# Patient Record
Sex: MALE | Race: BLACK OR AFRICAN AMERICAN | NOT HISPANIC OR LATINO | Employment: FULL TIME | URBAN - METROPOLITAN AREA
[De-identification: names, ages, dates, MRNs, and addresses within clinical notes are randomized per-mention and may not be internally consistent; named-entity substitution may affect disease eponyms.]

---

## 2018-06-11 ENCOUNTER — OFFICE VISIT (OUTPATIENT)
Dept: FAMILY MEDICINE | Facility: CLINIC | Age: 48
End: 2018-06-11
Payer: COMMERCIAL

## 2018-06-11 VITALS
HEART RATE: 61 BPM | SYSTOLIC BLOOD PRESSURE: 124 MMHG | HEIGHT: 66 IN | RESPIRATION RATE: 20 BRPM | OXYGEN SATURATION: 98 % | BODY MASS INDEX: 30.65 KG/M2 | WEIGHT: 190.69 LBS | DIASTOLIC BLOOD PRESSURE: 82 MMHG | TEMPERATURE: 99 F

## 2018-06-11 DIAGNOSIS — J01.90 ACUTE BACTERIAL SINUSITIS: Primary | ICD-10-CM

## 2018-06-11 DIAGNOSIS — B96.89 ACUTE BACTERIAL SINUSITIS: Primary | ICD-10-CM

## 2018-06-11 PROCEDURE — 99999 PR PBB SHADOW E&M-EST. PATIENT-LVL III: CPT | Mod: PBBFAC,,, | Performed by: FAMILY MEDICINE

## 2018-06-11 PROCEDURE — 99214 OFFICE O/P EST MOD 30 MIN: CPT | Mod: S$GLB,,, | Performed by: FAMILY MEDICINE

## 2018-06-11 PROCEDURE — 3008F BODY MASS INDEX DOCD: CPT | Mod: CPTII,S$GLB,, | Performed by: FAMILY MEDICINE

## 2018-06-11 RX ORDER — LORATADINE 10 MG/1
10 TABLET ORAL DAILY
Qty: 30 TABLET | Refills: 1 | Status: SHIPPED | OUTPATIENT
Start: 2018-06-11 | End: 2020-10-01 | Stop reason: ALTCHOICE

## 2018-06-11 RX ORDER — CODEINE PHOSPHATE AND GUAIFENESIN 10; 100 MG/5ML; MG/5ML
5 SOLUTION ORAL 3 TIMES DAILY PRN
Qty: 180 ML | Refills: 0 | Status: SHIPPED | OUTPATIENT
Start: 2018-06-11 | End: 2018-06-21

## 2018-06-11 RX ORDER — AZITHROMYCIN 250 MG/1
TABLET, FILM COATED ORAL
Qty: 6 TABLET | Refills: 0 | Status: SHIPPED | OUTPATIENT
Start: 2018-06-11 | End: 2020-10-01

## 2018-06-11 RX ORDER — FLUTICASONE PROPIONATE 50 MCG
1 SPRAY, SUSPENSION (ML) NASAL DAILY
Qty: 16 G | Refills: 1 | Status: SHIPPED | OUTPATIENT
Start: 2018-06-11 | End: 2018-07-11

## 2018-06-11 NOTE — PROGRESS NOTES
Chief Complaint   Patient presents with    Cough       HPI  Jossie Collins is a 47 y.o. male with multiple medical diagnoses as listed in the medical history and problem list that presents for cough.    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 Meds: theraflu, OTC.     Pt is known to me and was last seen by me on Visit date not found.    PAST MEDICAL HISTORY:  History reviewed. No pertinent past medical history.    PAST SURGICAL HISTORY:  Past Surgical History:   Procedure Laterality Date    JOINT REPLACEMENT  1988    right shoulder       SOCIAL HISTORY:  Social History     Social History    Marital status: Single     Spouse name: N/A    Number of children: N/A    Years of education: N/A     Occupational History    Not on file.     Social History Main Topics    Smoking status: Never Smoker    Smokeless tobacco: Never Used    Alcohol use Yes      Comment: social    Drug use: No    Sexual activity: Not on file     Other Topics Concern    Not on file     Social History Narrative    No narrative on file       FAMILY HISTORY:  History reviewed. No pertinent family history.    ALLERGIES AND MEDICATIONS: updated and reviewed.  Review of patient's allergies indicates:   Allergen Reactions    Penicillins Hives     Current Outpatient Prescriptions   Medication Sig Dispense Refill    azithromycin (ZITHROMAX Z-GARRY) 250 MG tablet 2 tabs today, then 1 tab per day for 4 days. 6 tablet 0    fluticasone (FLONASE) 50 mcg/actuation nasal spray 1 spray (50 mcg total) by Each Nare route once daily. 16 g 1    guaifenesin-codeine 100-10 mg/5 ml (CHERATUSSIN AC)  mg/5 mL syrup Take 5 mLs by mouth 3 (three) times daily as needed. 180 mL 0    loratadine (CLARITIN) 10 mg tablet Take 1 tablet (10 mg total) by mouth once daily. 30 tablet 1     No current facility-administered medications for this visit.        ROS  Review of Systems  "  Constitutional: Negative for activity change, appetite change and fever.   HENT: Positive for congestion, postnasal drip, rhinorrhea, sinus pressure and sneezing.    Eyes: Positive for itching.   Respiratory: Positive for cough. Negative for shortness of breath and wheezing.    Cardiovascular: Negative for chest pain.   Gastrointestinal: Negative for abdominal pain, diarrhea and nausea.   Endocrine: Negative for polydipsia.   Genitourinary: Negative for dysuria.   Musculoskeletal: Negative for neck stiffness.   Skin: Negative for rash.   Neurological: Negative for numbness.   Psychiatric/Behavioral: Negative for agitation and dysphoric mood.       Physical Exam  Vitals:    06/11/18 0912   BP: 124/82   Pulse: 61   Resp: 20   Temp: 99.3 °F (37.4 °C)    Body mass index is 30.78 kg/m².  Weight: 86.5 kg (190 lb 11.2 oz)   Height: 5' 6" (167.6 cm)     Physical Exam   Constitutional: He is oriented to person, place, and time. He appears well-developed and well-nourished.   HENT:   Head: Normocephalic.   Mouth/Throat: No oropharyngeal exudate.   Erythematous pharynx  Erythematous, edematous nares  Mild dull TMs bilaterally   Eyes: Pupils are equal, round, and reactive to light. No scleral icterus.   Neck: Normal range of motion. Neck supple.   Cardiovascular: Normal rate, regular rhythm and normal heart sounds.    Pulmonary/Chest: Effort normal and breath sounds normal. No respiratory distress.   Abdominal: Soft. Bowel sounds are normal. There is no tenderness.   Lymphadenopathy:     He has cervical adenopathy.   Neurological: He is alert and oriented to person, place, and time.   Skin: Skin is warm. No rash noted.   Psychiatric: He has a normal mood and affect. His behavior is normal. Judgment and thought content normal.       Health Maintenance       Date Due Completion Date    Lipid Panel 1971 ---    TETANUS VACCINE 10/09/1989 ---    Influenza Vaccine 08/01/2018 ---          Assessment & Plan    Acute bacterial " sinusitis  -     guaifenesin-codeine 100-10 mg/5 ml (CHERATUSSIN AC)  mg/5 mL syrup; Take 5 mLs by mouth 3 (three) times daily as needed.  Dispense: 180 mL; Refill: 0  -     azithromycin (ZITHROMAX Z-GARRY) 250 MG tablet; 2 tabs today, then 1 tab per day for 4 days.  Dispense: 6 tablet; Refill: 0  -     loratadine (CLARITIN) 10 mg tablet; Take 1 tablet (10 mg total) by mouth once daily.  Dispense: 30 tablet; Refill: 1  -     fluticasone (FLONASE) 50 mcg/actuation nasal spray; 1 spray (50 mcg total) by Each Nare route once daily.  Dispense: 16 g; Refill: 1  - Treat at this time, monitor symptoms closely  - Return if symptoms persist/worsen        Follow-up in about 4 weeks (around 7/9/2018).

## 2018-06-13 ENCOUNTER — PATIENT MESSAGE (OUTPATIENT)
Dept: FAMILY MEDICINE | Facility: CLINIC | Age: 48
End: 2018-06-13

## 2019-03-18 ENCOUNTER — OFFICE VISIT (OUTPATIENT)
Dept: FAMILY MEDICINE | Facility: CLINIC | Age: 49
End: 2019-03-18
Payer: COMMERCIAL

## 2019-03-18 VITALS
OXYGEN SATURATION: 96 % | RESPIRATION RATE: 17 BRPM | DIASTOLIC BLOOD PRESSURE: 80 MMHG | HEART RATE: 78 BPM | SYSTOLIC BLOOD PRESSURE: 112 MMHG | BODY MASS INDEX: 30.22 KG/M2 | TEMPERATURE: 99 F | WEIGHT: 188.06 LBS | HEIGHT: 66 IN

## 2019-03-18 DIAGNOSIS — Z00.00 WELL ADULT EXAM: ICD-10-CM

## 2019-03-18 DIAGNOSIS — R05.8 POST-VIRAL COUGH SYNDROME: Primary | ICD-10-CM

## 2019-03-18 PROCEDURE — 3008F BODY MASS INDEX DOCD: CPT | Mod: CPTII,S$GLB,, | Performed by: FAMILY MEDICINE

## 2019-03-18 PROCEDURE — 99999 PR PBB SHADOW E&M-EST. PATIENT-LVL III: ICD-10-PCS | Mod: PBBFAC,,, | Performed by: FAMILY MEDICINE

## 2019-03-18 PROCEDURE — 99213 PR OFFICE/OUTPT VISIT, EST, LEVL III, 20-29 MIN: ICD-10-PCS | Mod: S$GLB,,, | Performed by: FAMILY MEDICINE

## 2019-03-18 PROCEDURE — 99999 PR PBB SHADOW E&M-EST. PATIENT-LVL III: CPT | Mod: PBBFAC,,, | Performed by: FAMILY MEDICINE

## 2019-03-18 PROCEDURE — 3008F PR BODY MASS INDEX (BMI) DOCUMENTED: ICD-10-PCS | Mod: CPTII,S$GLB,, | Performed by: FAMILY MEDICINE

## 2019-03-18 PROCEDURE — 99213 OFFICE O/P EST LOW 20 MIN: CPT | Mod: S$GLB,,, | Performed by: FAMILY MEDICINE

## 2019-03-18 RX ORDER — METHYLPREDNISOLONE 4 MG/1
TABLET ORAL
Qty: 1 PACKAGE | Refills: 0 | Status: SHIPPED | OUTPATIENT
Start: 2019-03-18 | End: 2020-10-01

## 2019-03-18 RX ORDER — BENZONATATE 100 MG/1
CAPSULE ORAL
Qty: 30 CAPSULE | Refills: 0 | Status: SHIPPED | OUTPATIENT
Start: 2019-03-18 | End: 2020-10-01

## 2019-03-18 NOTE — PROGRESS NOTES
Chief Complaint   Patient presents with    Cough     stared 2 weeks ago        Jossie Collins is a 48 y.o. male who presents per the Chief Complaint.  Pt is known to me and was last seen by me on 10/10/2014.  All known chronic medical issues have been documented.       Cough   This is a new problem. The current episode started 1 to 4 weeks ago. The problem has been gradually improving. The problem occurs every few minutes. The cough is non-productive. Associated symptoms include postnasal drip and a sore throat. Pertinent negatives include no chest pain, chills, ear congestion, ear pain, eye redness, fever, headaches, heartburn, hemoptysis, myalgias, nasal congestion, rash, rhinorrhea, shortness of breath, sweats, weight loss or wheezing. Nothing aggravates the symptoms. He has tried OTC cough suppressant for the symptoms. His past medical history is significant for environmental allergies. There is no history of asthma, bronchiectasis, bronchitis, COPD, emphysema or pneumonia.        ROS  Review of Systems   Constitutional: Negative.  Negative for activity change, appetite change, chills, diaphoresis, fatigue, fever, unexpected weight change and weight loss.   HENT: Positive for postnasal drip, sore throat and voice change. Negative for congestion, dental problem, drooling, ear discharge, ear pain, facial swelling, hearing loss, mouth sores, nosebleeds, rhinorrhea, sinus pressure, sinus pain, sneezing, tinnitus and trouble swallowing.    Eyes: Negative for pain, discharge, redness and visual disturbance.   Respiratory: Positive for cough. Negative for hemoptysis, choking, chest tightness, shortness of breath and wheezing.    Cardiovascular: Negative for chest pain and leg swelling.   Gastrointestinal: Negative for abdominal pain, constipation, diarrhea, heartburn, nausea and vomiting.   Genitourinary: Negative for difficulty urinating, dysuria, flank pain, frequency and urgency.   Musculoskeletal: Negative.   "Negative for arthralgias, back pain, gait problem, joint swelling, myalgias, neck pain and neck stiffness.   Skin: Negative.  Negative for rash.   Allergic/Immunologic: Positive for environmental allergies. Negative for food allergies and immunocompromised state.   Neurological: Negative.  Negative for dizziness, seizures, syncope, weakness, light-headedness and headaches.   Psychiatric/Behavioral: Positive for sleep disturbance. Negative for confusion, decreased concentration and dysphoric mood. The patient is not nervous/anxious.        Physical Exam  Vitals:    03/18/19 1107   BP: 112/80   Pulse: 78   Resp: 17   Temp: 98.6 °F (37 °C)    Body mass index is 30.35 kg/m².  Weight: 85.3 kg (188 lb 0.8 oz)   Height: 5' 6" (167.6 cm)     Physical Exam   Constitutional: He appears well-developed and well-nourished. He is cooperative.  Non-toxic appearance. He does not have a sickly appearance. He does not appear ill. No distress.   HENT:   Head: Normocephalic and atraumatic.   Right Ear: Hearing, external ear and ear canal normal. No tenderness. No foreign bodies. No mastoid tenderness. Tympanic membrane is bulging. Tympanic membrane is not injected, not scarred, not perforated, not erythematous and not retracted. No decreased hearing is noted.   Left Ear: Hearing, external ear and ear canal normal. No tenderness. No foreign bodies. No mastoid tenderness. Tympanic membrane is bulging. Tympanic membrane is not injected, not scarred, not perforated, not erythematous and not retracted. No decreased hearing is noted.   Nose: Mucosal edema and rhinorrhea present. No nose lacerations, sinus tenderness, nasal deformity, septal deviation or nasal septal hematoma. No epistaxis. Right sinus exhibits no maxillary sinus tenderness and no frontal sinus tenderness. Left sinus exhibits no maxillary sinus tenderness and no frontal sinus tenderness.   Mouth/Throat: Uvula is midline and mucous membranes are normal. He does not have " dentures. No oral lesions. No dental abscesses or dental caries. Posterior oropharyngeal erythema present. No oropharyngeal exudate, posterior oropharyngeal edema or tonsillar abscesses.   Eyes: Conjunctivae and EOM are normal. Pupils are equal, round, and reactive to light. Right eye exhibits no chemosis, no discharge and no exudate. No foreign body present in the right eye. Left eye exhibits no chemosis, no discharge and no exudate. No foreign body present in the left eye. No scleral icterus.   Neck: Normal range of motion and full passive range of motion without pain. No neck rigidity.   Cardiovascular: Normal rate, regular rhythm, S1 normal, S2 normal and normal heart sounds. Exam reveals no gallop and no friction rub.   No murmur heard.  Pulmonary/Chest: Effort normal and breath sounds normal. He has no decreased breath sounds. He has no wheezes. He has no rhonchi. He has no rales.   Lymphadenopathy:        Head (right side): No submental, no submandibular, no tonsillar, no preauricular and no posterior auricular adenopathy present.        Head (left side): No submental, no submandibular, no tonsillar, no preauricular and no posterior auricular adenopathy present.   Neurological: He is alert.       Assessment & Plan    Discussion of plan of care including treatment options regarding health and wellness were reviewed and discussed with patient.  Any changes to medication or treatment plan, as well as any screening blood test, imaging, or referrals to specialist, are documented.  Follow up as indicated.     1. Post-viral cough syndrome  - methylPREDNISolone (MEDROL DOSEPACK) 4 mg tablet; use as directed  Dispense: 1 Package; Refill: 0  - benzonatate (TESSALON) 100 MG capsule; 1-2 capsules every 8 hours as needed for cough.  Dispense: 30 capsule; Refill: 0    2. Well adult exam  Due for annual; blood test ordered to be done before physical exam.  - CBC auto differential; Future  - Comprehensive metabolic panel;  Future  - Lipid panel; Future  - TSH; Future  - T4, free; Future  - Hemoglobin A1c; Future  - PSA, Screening; Future      Follow up documented    ACTIVE MEDICAL ISSUES:  Documented in Problem List    PAST MEDICAL HISTORY  Documented    PAST SURGICAL HISTORY:  Documented    SOCIAL HISTORY:  Documented    FAMILY HISTORY:  Documented    ALLERGIES AND MEDICATIONS: updated and reviewed.  Documented    Health Maintenance       Date Due Completion Date    Lipid Panel 1971 ---    TETANUS VACCINE 10/09/1989 ---    Influenza Vaccine 08/01/2018 ---

## 2020-08-14 DIAGNOSIS — Z11.59 NEED FOR HEPATITIS C SCREENING TEST: ICD-10-CM

## 2020-10-01 ENCOUNTER — OFFICE VISIT (OUTPATIENT)
Dept: URGENT CARE | Facility: CLINIC | Age: 50
End: 2020-10-01
Payer: COMMERCIAL

## 2020-10-01 VITALS
DIASTOLIC BLOOD PRESSURE: 77 MMHG | OXYGEN SATURATION: 96 % | TEMPERATURE: 100 F | BODY MASS INDEX: 31.66 KG/M2 | WEIGHT: 197 LBS | HEIGHT: 66 IN | HEART RATE: 75 BPM | RESPIRATION RATE: 17 BRPM | SYSTOLIC BLOOD PRESSURE: 142 MMHG

## 2020-10-01 DIAGNOSIS — R05.8 PRODUCTIVE COUGH: ICD-10-CM

## 2020-10-01 DIAGNOSIS — J06.9 VIRAL URI WITH COUGH: Primary | ICD-10-CM

## 2020-10-01 LAB
CTP QC/QA: YES
SARS-COV-2 RDRP RESP QL NAA+PROBE: NEGATIVE

## 2020-10-01 PROCEDURE — 99213 OFFICE O/P EST LOW 20 MIN: CPT | Mod: S$GLB,,, | Performed by: NURSE PRACTITIONER

## 2020-10-01 PROCEDURE — U0002: ICD-10-PCS | Mod: QW,S$GLB,, | Performed by: NURSE PRACTITIONER

## 2020-10-01 PROCEDURE — 99213 PR OFFICE/OUTPT VISIT, EST, LEVL III, 20-29 MIN: ICD-10-PCS | Mod: S$GLB,,, | Performed by: NURSE PRACTITIONER

## 2020-10-01 PROCEDURE — U0002 COVID-19 LAB TEST NON-CDC: HCPCS | Mod: QW,S$GLB,, | Performed by: NURSE PRACTITIONER

## 2020-10-01 RX ORDER — LEVOCETIRIZINE DIHYDROCHLORIDE 5 MG/1
5 TABLET, FILM COATED ORAL NIGHTLY
Qty: 90 TABLET | Refills: 0 | Status: SHIPPED | OUTPATIENT
Start: 2020-10-01 | End: 2021-12-06

## 2020-10-01 RX ORDER — FLUTICASONE PROPIONATE 50 MCG
1 SPRAY, SUSPENSION (ML) NASAL DAILY
COMMUNITY
End: 2023-03-10 | Stop reason: SDUPTHER

## 2020-10-01 NOTE — PROGRESS NOTES
"Subjective:       Patient ID: Jossie Collins is a 49 y.o. male.    Vitals:  height is 5' 6" (1.676 m) and weight is 89.4 kg (197 lb). His temperature is 99.5 °F (37.5 °C). His blood pressure is 142/77 (abnormal) and his pulse is 75. His respiration is 17 and oxygen saturation is 96%.     Chief Complaint: Sinus Problem    Patient presents today with complaints of productive cough, nasal congestion and post nasal drip for 4-5 days. He also endorses similar symptoms in the previous week that resolved after 1-2 days with the use of mucinex and delsym. He also uses flonase everyday. He denies fever, chills, ear pain or discharge, sore throat, SOB, wheezing, N/V/D. He denies sick contacts. He has been using mucinex and delsym this time without significant relief.       Constitution: Negative for fatigue and fever.   HENT: Positive for postnasal drip. Negative for ear discharge.    Neck: Negative for painful lymph nodes.   Cardiovascular: Negative for chest pain and leg swelling.   Eyes: Negative for double vision and blurred vision.   Respiratory: Positive for sputum production. Negative for wheezing.    Gastrointestinal: Negative for nausea, vomiting and diarrhea.   Genitourinary: Negative for dysuria, frequency and urgency.   Musculoskeletal: Negative for joint pain, joint swelling, muscle cramps and muscle ache.   Skin: Negative for color change, pale and rash.   Allergic/Immunologic: Negative for seasonal allergies.   Neurological: Negative for dizziness, history of vertigo, light-headedness and passing out.   Hematologic/Lymphatic: Negative for swollen lymph nodes, easy bruising/bleeding and history of blood clots. Does not bruise/bleed easily.   Psychiatric/Behavioral: Negative for nervous/anxious, sleep disturbance and depression. The patient is not nervous/anxious.        Objective:      Physical Exam   Constitutional: He is oriented to person, place, and time. He appears well-developed. He is cooperative.  " Non-toxic appearance. He does not appear ill. No distress.   HENT:   Head: Normocephalic and atraumatic.   Ears:   Right Ear: Hearing, tympanic membrane, external ear and ear canal normal.   Left Ear: Hearing, tympanic membrane, external ear and ear canal normal.   Nose: Nose normal. No mucosal edema, rhinorrhea or nasal deformity. No epistaxis. Right sinus exhibits no maxillary sinus tenderness and no frontal sinus tenderness. Left sinus exhibits no maxillary sinus tenderness and no frontal sinus tenderness.   Mouth/Throat: Uvula is midline, oropharynx is clear and moist and mucous membranes are normal. No trismus in the jaw. Normal dentition. No uvula swelling. No oropharyngeal exudate, posterior oropharyngeal edema or posterior oropharyngeal erythema.   Eyes: Conjunctivae and lids are normal. No scleral icterus.   Neck: Trachea normal, full passive range of motion without pain and phonation normal. Neck supple. No neck rigidity. No edema and no erythema present.   Cardiovascular: Normal rate, regular rhythm, normal heart sounds and normal pulses.   Pulmonary/Chest: Effort normal and breath sounds normal. No respiratory distress. He has no decreased breath sounds. He has no rhonchi.   Abdominal: Normal appearance.   Musculoskeletal: Normal range of motion.         General: No deformity.   Neurological: He is alert and oriented to person, place, and time. He exhibits normal muscle tone. Coordination normal.   Skin: Skin is warm, dry, intact, not diaphoretic and not pale. Psychiatric: His speech is normal and behavior is normal. Judgment and thought content normal.   Nursing note and vitals reviewed.        Assessment:       1. Viral URI with cough    2. Productive cough        Plan:         Viral URI with cough    Productive cough  -     POCT COVID-19 Rapid Screening - NEGATIVE  -     levocetirizine (XYZAL) 5 MG tablet; Take 1 tablet (5 mg total) by mouth every evening.  Dispense: 90 tablet; Refill: 0  -      Continue mucinex and delsym as needed for symptom management  -     Increase rest and fluids  -     Red flag precautions - fever, chills, SOB, wheezing, worsening symptoms RTC

## 2020-10-01 NOTE — PATIENT INSTRUCTIONS
Preventing Common Respiratory Infections  Respiratory infections such as colds and influenza (the flu) are common in winter. These infections are often caused by viruses. They may share some symptoms, but not all respiratory infections are the same. Some make you more sick than others. You can take steps to prevent common respiratory infections. And if you get sick, you can take care of yourself to keep the infection from getting worse.    What is a cold?  · Symptoms include runny nose, coughing and sneezing, and sore throat. Cold symptoms tend to be milder than flu symptoms.  · Symptoms tend to come on slowly. They last for a few days to about a week.  · With a cold, you can still do most of the things you usually do.  What is the flu?  · Symptoms include fever, headache, fatigue, cough, sore throat, runny nose, and muscle aches. Children may have upset stomach and vomiting, but adults usually dont.  · Symptoms tend to come on quickly. Some, such as fatigue and cough, can last a few weeks.  · With the flu, you may feel worn out and not able to do normal activities.  · Its most likely NOT the flu if an adult has vomiting or diarrhea for a day or two. This so-called stomach flu is probably a GI (gastrointestinal) infection.  When the infection gets worse  Without proper care, a respiratory infection can get worse. It can lead to serious complications and death. If you arent getting better, call your healthcare provider. Complications can include:  · Bronchitis (infection of the airways that leads to shortness of breath and coughing up thick yellow or green mucus)  · Pneumonia (infection of the lungs in which fluid and mucus settle in the lungs, making breathing difficult)  · Worsening of chronic conditions such as heart failure, chronic lung disease, asthma, or diabetes  · Severe dehydration (loss of fluids)  · Sinus problems  · Ear infections   Get a flu vaccine  A flu vaccine protects you from influenza  (but not other colds or infections). Get a vaccine each fall, before flu season starts. This can be done at a clinic, healthcare providers office, drugstore, senior center, or through your workplace.  Get pneumococcal vaccines  Pneumonia can be a complication of influenza. There are 2 pneumococcal pneumonia vaccines that protect against many types of pneumonia. Talk with your healthcare provider about these important vaccines.   Keep germs from spreading  No one likes getting sick. To protect yourself and others from cold and flu germs:  · Wash your hands often. Use alcohol-based hand  when you dont have access to soap and water.  · Dont touch your eyes, nose, and mouth. This may help you keep germs out of your body.  · Try to avoid people with respiratory infections. You may want to stay out of crowds during flu season (winter).  · Ask your healthcare provider if you should get a pneumonia vaccination.  How to wash your hands  · Use warm water and plenty of soap. Work up a good lather.  · Clean your whole hand, under your nails, between your fingers, and up your wrists. Wash for at least 15 to 20 seconds. Dont just wipe--rub well.  · Rinse. Let the water run down your fingertips, not up your wrists.  · In a public restroom, use a paper towel to turn off the faucet and open the door.   Date Last Reviewed: 12/1/2016  © 5177-1722 [employer] [address] [place] [address], [place] [address]. All rights reserved. This information is not intended as a substitute for professional medical care. Always follow your healthcare professional's instructions.

## 2020-10-05 ENCOUNTER — PATIENT MESSAGE (OUTPATIENT)
Dept: ADMINISTRATIVE | Facility: HOSPITAL | Age: 50
End: 2020-10-05

## 2021-01-04 ENCOUNTER — PATIENT MESSAGE (OUTPATIENT)
Dept: ADMINISTRATIVE | Facility: HOSPITAL | Age: 51
End: 2021-01-04

## 2021-03-31 ENCOUNTER — PATIENT MESSAGE (OUTPATIENT)
Dept: ADMINISTRATIVE | Facility: CLINIC | Age: 51
End: 2021-03-31

## 2021-04-05 ENCOUNTER — PATIENT MESSAGE (OUTPATIENT)
Dept: ADMINISTRATIVE | Facility: HOSPITAL | Age: 51
End: 2021-04-05

## 2021-05-04 ENCOUNTER — PATIENT MESSAGE (OUTPATIENT)
Dept: RESEARCH | Facility: HOSPITAL | Age: 51
End: 2021-05-04

## 2021-10-04 ENCOUNTER — PATIENT MESSAGE (OUTPATIENT)
Dept: ADMINISTRATIVE | Facility: HOSPITAL | Age: 51
End: 2021-10-04

## 2021-11-20 ENCOUNTER — HOSPITAL ENCOUNTER (EMERGENCY)
Facility: HOSPITAL | Age: 51
Discharge: HOME | End: 2021-11-20
Attending: EMERGENCY MEDICINE
Payer: COMMERCIAL

## 2021-11-20 VITALS
OXYGEN SATURATION: 98 % | HEIGHT: 66 IN | BODY MASS INDEX: 30.86 KG/M2 | TEMPERATURE: 97 F | WEIGHT: 192 LBS | SYSTOLIC BLOOD PRESSURE: 138 MMHG | DIASTOLIC BLOOD PRESSURE: 74 MMHG | RESPIRATION RATE: 18 BRPM | HEART RATE: 87 BPM

## 2021-11-20 DIAGNOSIS — S61.432A PUNCTURE WOUND OF LEFT HAND WITHOUT FOREIGN BODY, INITIAL ENCOUNTER: Primary | ICD-10-CM

## 2021-11-20 PROCEDURE — 25000003 PHARM REV CODE 250: Performed by: PHYSICIAN ASSISTANT

## 2021-11-20 PROCEDURE — 99284 EMERGENCY DEPT VISIT MOD MDM: CPT

## 2021-11-20 PROCEDURE — 90715 TDAP VACCINE 7 YRS/> IM: CPT | Performed by: PHYSICIAN ASSISTANT

## 2021-11-20 PROCEDURE — 63600175 PHARM REV CODE 636 W HCPCS: Performed by: PHYSICIAN ASSISTANT

## 2021-11-20 PROCEDURE — 90471 IMMUNIZATION ADMIN: CPT | Performed by: PHYSICIAN ASSISTANT

## 2021-11-20 RX ORDER — DOXYCYCLINE 100 MG/1
100 CAPSULE ORAL EVERY 12 HOURS
Qty: 20 CAPSULE | Refills: 0 | Status: SHIPPED | OUTPATIENT
Start: 2021-11-20 | End: 2021-11-30

## 2021-11-20 RX ORDER — IBUPROFEN 600 MG/1
600 TABLET ORAL EVERY 6 HOURS PRN
Qty: 20 TABLET | Refills: 0 | Status: SHIPPED | OUTPATIENT
Start: 2021-11-20 | End: 2021-11-25

## 2021-11-20 RX ORDER — ACETAMINOPHEN 500 MG
500 TABLET ORAL EVERY 4 HOURS PRN
Qty: 20 TABLET | Refills: 0 | Status: SHIPPED | OUTPATIENT
Start: 2021-11-20 | End: 2021-11-25

## 2021-11-20 RX ORDER — DOXYCYCLINE HYCLATE 100 MG
100 TABLET ORAL
Status: COMPLETED | OUTPATIENT
Start: 2021-11-20 | End: 2021-11-20

## 2021-11-20 RX ORDER — IBUPROFEN 600 MG/1
600 TABLET ORAL
Status: COMPLETED | OUTPATIENT
Start: 2021-11-20 | End: 2021-11-20

## 2021-11-20 RX ADMIN — IBUPROFEN 600 MG: 600 TABLET, FILM COATED ORAL at 06:11

## 2021-11-20 RX ADMIN — DOXYCYCLINE HYCLATE 100 MG: 100 TABLET, COATED ORAL at 06:11

## 2021-11-20 RX ADMIN — TETANUS TOXOID, REDUCED DIPHTHERIA TOXOID AND ACELLULAR PERTUSSIS VACCINE, ADSORBED 0.5 ML: 5; 2.5; 8; 8; 2.5 SUSPENSION INTRAMUSCULAR at 07:11

## 2021-12-06 ENCOUNTER — OFFICE VISIT (OUTPATIENT)
Dept: FAMILY MEDICINE | Facility: CLINIC | Age: 51
End: 2021-12-06
Payer: COMMERCIAL

## 2021-12-06 VITALS
DIASTOLIC BLOOD PRESSURE: 86 MMHG | HEIGHT: 66 IN | HEART RATE: 80 BPM | WEIGHT: 183.88 LBS | RESPIRATION RATE: 16 BRPM | OXYGEN SATURATION: 97 % | TEMPERATURE: 99 F | BODY MASS INDEX: 29.55 KG/M2 | SYSTOLIC BLOOD PRESSURE: 130 MMHG

## 2021-12-06 DIAGNOSIS — Z12.11 ENCOUNTER FOR COLORECTAL CANCER SCREENING: ICD-10-CM

## 2021-12-06 DIAGNOSIS — Z00.00 WELL ADULT EXAM: Primary | ICD-10-CM

## 2021-12-06 DIAGNOSIS — Z12.12 ENCOUNTER FOR COLORECTAL CANCER SCREENING: ICD-10-CM

## 2021-12-06 PROCEDURE — 99999 PR PBB SHADOW E&M-EST. PATIENT-LVL V: ICD-10-PCS | Mod: PBBFAC,,, | Performed by: FAMILY MEDICINE

## 2021-12-06 PROCEDURE — 99396 PR PREVENTIVE VISIT,EST,40-64: ICD-10-PCS | Mod: S$GLB,,, | Performed by: FAMILY MEDICINE

## 2021-12-06 PROCEDURE — 99396 PREV VISIT EST AGE 40-64: CPT | Mod: S$GLB,,, | Performed by: FAMILY MEDICINE

## 2021-12-06 PROCEDURE — 99999 PR PBB SHADOW E&M-EST. PATIENT-LVL V: CPT | Mod: PBBFAC,,, | Performed by: FAMILY MEDICINE

## 2022-02-04 ENCOUNTER — TELEPHONE (OUTPATIENT)
Dept: ENDOSCOPY | Facility: HOSPITAL | Age: 52
End: 2022-02-04
Payer: COMMERCIAL

## 2022-02-04 DIAGNOSIS — Z12.11 SCREENING FOR COLON CANCER: Primary | ICD-10-CM

## 2022-02-04 DIAGNOSIS — Z01.818 PRE-OP TESTING: Primary | ICD-10-CM

## 2022-02-04 RX ORDER — POLYETHYLENE GLYCOL 3350, SODIUM SULFATE ANHYDROUS, SODIUM BICARBONATE, SODIUM CHLORIDE, POTASSIUM CHLORIDE 236; 22.74; 6.74; 5.86; 2.97 G/4L; G/4L; G/4L; G/4L; G/4L
4 POWDER, FOR SOLUTION ORAL ONCE
Qty: 4000 ML | Refills: 0 | Status: SHIPPED | OUTPATIENT
Start: 2022-02-04 | End: 2022-02-04

## 2022-03-11 ENCOUNTER — LAB VISIT (OUTPATIENT)
Dept: FAMILY MEDICINE | Facility: CLINIC | Age: 52
End: 2022-03-11
Payer: COMMERCIAL

## 2022-03-11 DIAGNOSIS — Z01.818 PRE-OP TESTING: ICD-10-CM

## 2022-03-11 LAB
SARS-COV-2 RNA RESP QL NAA+PROBE: NOT DETECTED
SARS-COV-2- CYCLE NUMBER: NORMAL

## 2022-03-11 PROCEDURE — U0003 INFECTIOUS AGENT DETECTION BY NUCLEIC ACID (DNA OR RNA); SEVERE ACUTE RESPIRATORY SYNDROME CORONAVIRUS 2 (SARS-COV-2) (CORONAVIRUS DISEASE [COVID-19]), AMPLIFIED PROBE TECHNIQUE, MAKING USE OF HIGH THROUGHPUT TECHNOLOGIES AS DESCRIBED BY CMS-2020-01-R: HCPCS | Performed by: FAMILY MEDICINE

## 2022-03-11 PROCEDURE — U0005 INFEC AGEN DETEC AMPLI PROBE: HCPCS | Performed by: FAMILY MEDICINE

## 2022-03-14 ENCOUNTER — HOSPITAL ENCOUNTER (OUTPATIENT)
Facility: HOSPITAL | Age: 52
Discharge: HOME | End: 2022-03-14
Attending: INTERNAL MEDICINE | Admitting: INTERNAL MEDICINE
Payer: COMMERCIAL

## 2022-03-14 ENCOUNTER — ANESTHESIA EVENT (OUTPATIENT)
Dept: ENDOSCOPY | Facility: HOSPITAL | Age: 52
End: 2022-03-14

## 2022-03-14 ENCOUNTER — ANESTHESIA (OUTPATIENT)
Dept: ENDOSCOPY | Facility: HOSPITAL | Age: 52
End: 2022-03-14
Payer: COMMERCIAL

## 2022-03-14 VITALS
RESPIRATION RATE: 18 BRPM | SYSTOLIC BLOOD PRESSURE: 159 MMHG | OXYGEN SATURATION: 98 % | TEMPERATURE: 98 F | HEART RATE: 62 BPM | DIASTOLIC BLOOD PRESSURE: 81 MMHG

## 2022-03-14 DIAGNOSIS — Z12.11 SCREEN FOR COLON CANCER: ICD-10-CM

## 2022-03-14 PROCEDURE — 37000009 HC ANESTHESIA EA ADD 15 MINS: Performed by: INTERNAL MEDICINE

## 2022-03-14 PROCEDURE — 25000003 PHARM REV CODE 250: Performed by: STUDENT IN AN ORGANIZED HEALTH CARE EDUCATION/TRAINING PROGRAM

## 2022-03-14 PROCEDURE — 88305 TISSUE EXAM BY PATHOLOGIST: CPT | Mod: 26,,, | Performed by: PATHOLOGY

## 2022-03-14 PROCEDURE — 88305 TISSUE EXAM BY PATHOLOGIST: ICD-10-PCS | Mod: 26,,, | Performed by: PATHOLOGY

## 2022-03-14 PROCEDURE — D9220A PRA ANESTHESIA: Mod: 33,,, | Performed by: STUDENT IN AN ORGANIZED HEALTH CARE EDUCATION/TRAINING PROGRAM

## 2022-03-14 PROCEDURE — 45380 COLONOSCOPY AND BIOPSY: CPT | Mod: 33,,, | Performed by: INTERNAL MEDICINE

## 2022-03-14 PROCEDURE — D9220A PRA ANESTHESIA: ICD-10-PCS | Mod: 33,,, | Performed by: STUDENT IN AN ORGANIZED HEALTH CARE EDUCATION/TRAINING PROGRAM

## 2022-03-14 PROCEDURE — 27201012 HC FORCEPS, HOT/COLD, DISP: Performed by: INTERNAL MEDICINE

## 2022-03-14 PROCEDURE — 45380 COLONOSCOPY AND BIOPSY: CPT | Mod: PT | Performed by: INTERNAL MEDICINE

## 2022-03-14 PROCEDURE — 63600175 PHARM REV CODE 636 W HCPCS: Performed by: STUDENT IN AN ORGANIZED HEALTH CARE EDUCATION/TRAINING PROGRAM

## 2022-03-14 PROCEDURE — 37000008 HC ANESTHESIA 1ST 15 MINUTES: Performed by: INTERNAL MEDICINE

## 2022-03-14 PROCEDURE — 45380 PR COLONOSCOPY,BIOPSY: ICD-10-PCS | Mod: 33,,, | Performed by: INTERNAL MEDICINE

## 2022-03-14 PROCEDURE — 88305 TISSUE EXAM BY PATHOLOGIST: CPT | Performed by: PATHOLOGY

## 2022-03-14 PROCEDURE — D9220A PRA ANESTHESIA: ICD-10-PCS | Mod: 33,,, | Performed by: ANESTHESIOLOGY

## 2022-03-14 PROCEDURE — D9220A PRA ANESTHESIA: Mod: 33,,, | Performed by: ANESTHESIOLOGY

## 2022-03-14 RX ORDER — LIDOCAINE HYDROCHLORIDE 20 MG/ML
INJECTION INTRAVENOUS
Status: DISCONTINUED | OUTPATIENT
Start: 2022-03-14 | End: 2022-03-14

## 2022-03-14 RX ORDER — PROPOFOL 10 MG/ML
VIAL (ML) INTRAVENOUS
Status: DISCONTINUED | OUTPATIENT
Start: 2022-03-14 | End: 2022-03-14

## 2022-03-14 RX ORDER — PROPOFOL 10 MG/ML
INJECTION, EMULSION INTRAVENOUS
Status: DISCONTINUED
Start: 2022-03-14 | End: 2022-03-14 | Stop reason: HOSPADM

## 2022-03-14 RX ORDER — SODIUM CHLORIDE 9 MG/ML
INJECTION, SOLUTION INTRAVENOUS CONTINUOUS
Status: DISCONTINUED | OUTPATIENT
Start: 2022-03-14 | End: 2022-03-14 | Stop reason: HOSPADM

## 2022-03-14 RX ORDER — LIDOCAINE HYDROCHLORIDE 20 MG/ML
INJECTION, SOLUTION EPIDURAL; INFILTRATION; INTRACAUDAL; PERINEURAL
Status: DISCONTINUED
Start: 2022-03-14 | End: 2022-03-14 | Stop reason: HOSPADM

## 2022-03-14 RX ADMIN — PROPOFOL 80 MG: 10 INJECTION, EMULSION INTRAVENOUS at 09:03

## 2022-03-14 RX ADMIN — PROPOFOL 20 MG: 10 INJECTION, EMULSION INTRAVENOUS at 10:03

## 2022-03-14 RX ADMIN — LIDOCAINE HYDROCHLORIDE 100 MG: 20 INJECTION, SOLUTION INTRAVENOUS at 09:03

## 2022-03-14 RX ADMIN — SODIUM CHLORIDE: 0.9 INJECTION, SOLUTION INTRAVENOUS at 09:03

## 2022-03-14 NOTE — H&P
Short Stay Endoscopy History and Physical    PCP - Azikiwe K Lombard, MD    Procedure - Colonoscopy  ASA - per anesthesia  Mallampati - per anesthesia  History of Anesthesia problems - no  Family history Anesthesia problems - no   Plan of anesthesia - General    HPI:  This is a 51 y.o. male here for evaluation of : asymptomatic screening exam      ROS:  Constitutional: No fevers, chills, No weight loss  CV: No chest pain  Pulm: No cough, No shortness of breath  GI: see HPI  Derm: No rash    Medical History:  has no past medical history on file.    Surgical History:  has a past surgical history that includes Joint replacement (1988).    Family History: family history is not on file.. Otherwise no colon cancer, inflammatory bowel disease, or GI malignancies.    Social History:  reports that he has never smoked. He has never used smokeless tobacco. He reports current alcohol use. He reports that he does not use drugs.    Review of patient's allergies indicates:   Allergen Reactions    Penicillins Hives       Medications:   Medications Prior to Admission   Medication Sig Dispense Refill Last Dose    fluticasone propionate (FLONASE) 50 mcg/actuation nasal spray 1 spray by Each Nostril route once daily.            Physical Exam:    Vital Signs:   Vitals:    03/14/22 0845   BP: 139/64   Pulse: 72   Resp: 18   Temp: 98.5 °F (36.9 °C)       Gen: NAD, lying comfortably  HENT: NCAT, oropharynx clear  Eyes: anicteric sclerae, EOMI grossly  Neck: supple, no visible masses/goiter  Cardiac: RRR  Lungs: non-labored breathing  Abd: soft, NT/ND, normoactive BS  Ext: no LE edema, warm, well perfused  Skin: skin intact on exposed body parts, no visible rashes, lesions  Neuro: A&Ox4, neuro exam grossly intact, moves all extremities  Psych: appropriate mood, affect        Labs:  Lab Results   Component Value Date    WBC 4.36 12/06/2021    HGB 13.8 (L) 12/06/2021    HCT 40.7 12/06/2021     12/06/2021    CHOL 169 12/06/2021     TRIG 152 (H) 12/06/2021    HDL 37 (L) 12/06/2021    ALT 27 12/06/2021    AST 27 12/06/2021     12/06/2021    K 4.3 12/06/2021     12/06/2021    CREATININE 1.0 12/06/2021    BUN 13 12/06/2021    CO2 26 12/06/2021    TSH 0.935 12/06/2021    PSA 0.41 12/06/2021    HGBA1C 4.8 12/06/2021       Plan:  Colonoscopy for colon cancer screening.    I have explained the risks and benefits of endoscopy procedures to the patient including but not limited to bleeding, perforation, infection, and death.  The patient was asked if they understand and allowed to ask any further questions to their satisfaction.    Marylin Pedraza MD

## 2022-03-14 NOTE — ANESTHESIA POSTPROCEDURE EVALUATION
Anesthesia Post Evaluation    Patient: Jossie Collins    Procedure(s) Performed: Procedure(s) (LRB):  COLONOSCOPY (N/A)    Final Anesthesia Type: general      Patient location during evaluation: GI PACU  Patient participation: Yes- Able to Participate  Level of consciousness: awake and alert  Post-procedure vital signs: reviewed and stable  Pain management: adequate  Airway patency: patent    PONV status at discharge: No PONV  Anesthetic complications: no      Cardiovascular status: blood pressure returned to baseline  Respiratory status: unassisted and spontaneous ventilation  Hydration status: euvolemic  Follow-up not needed.          Vitals Value Taken Time   /62 03/14/22 1025   Temp 36.5 °C (97.7 °F) 03/14/22 1025   Pulse 75 03/14/22 1025   Resp 18 03/14/22 1025   SpO2 96 % 03/14/22 1025         No case tracking events are documented in the log.      Pain/Sussy Score: No data recorded

## 2022-03-14 NOTE — PROVATION PATIENT INSTRUCTIONS
Discharge Summary/Instructions after an Endoscopic Procedure  Patient Name: Jossie Collins  Patient MRN: [id number]  Patient YOB: 1970  Monday, March 14, 2022  Marylin Pedraza MD  Dear patient,  As a result of recent federal legislation (The Federal Cures Act), you may   receive lab or pathology results from your procedure in your [facility]   account before your physician is able to contact you. Your physician or   their representative will relay the results to you with their   recommendations at their soonest availability.  Thank you,  RESTRICTIONS:  During your procedure today, you received medications for sedation.  These   medications may affect your judgment, balance and coordination.  Therefore,   for 24 hours, you have the following restrictions:   - DO NOT drive a car, operate machinery, make legal/financial decisions,   sign important papers or drink alcohol.    ACTIVITY:  Today: no heavy lifting, straining or running due to procedural   sedation/anesthesia.  The following day: return to full activity including work.  DIET:  Eat and drink normally unless instructed otherwise.     TREATMENT FOR COMMON SIDE EFFECTS:  - Mild abdominal pain, nausea, belching, bloating or excessive gas:  rest,   eat lightly and use a heating pad.  - Sore Throat: treat with throat lozenges and/or gargle with warm salt   water.  - Because air was used during the procedure, expelling large amounts of air   from your rectum or belching is normal.  - If a bowel prep was taken, you may not have a bowel movement for 1-3 days.    This is normal.  SYMPTOMS TO WATCH FOR AND REPORT TO YOUR PHYSICIAN:  1. Abdominal pain or bloating, other than gas cramps.  2. Chest pain.  3. Back pain.  4. Signs of infection such as: chills or fever occurring within 24 hours   after the procedure.  5. Rectal bleeding, which would show as bright red, maroon, or black stools.   (A tablespoon of blood from the rectum is not serious, especially  if   hemorrhoids are present.)  6. Vomiting.  7. Weakness or dizziness.  GO DIRECTLY TO THE NEAREST EMERGENCY ROOM IF YOU HAVE ANY OF THE FOLLOWING:      Difficulty breathing              Chills and/or fever over 101 F   Persistent vomiting and/or vomiting blood   Severe abdominal pain   Severe chest pain   Black, tarry stools   Bleeding- more than one tablespoon   Any other symptom or condition that you feel may need urgent attention  Your doctor recommends these additional instructions:  If any biopsies were taken, your doctors clinic will contact you in 1 to 2   weeks with any results.  - Discharge patient to home.   - Resume previous diet.   - Continue present medications.   - Await pathology results.   - Repeat colonoscopy in 5 years for surveillance.  For questions, problems or results please call your physician - Marylin Pedraza MD at Work:  [phone].  [facility] can be reached at [phone]     IF A COMPLICATION OR EMERGENCY SITUATION ARISES AND YOU ARE UNABLE TO REACH   YOUR PHYSICIAN - GO DIRECTLY TO THE EMERGENCY ROOM.  Marylin Pedraza MD  3/14/2022 10:24:26 AM  This report has been verified and signed electronically.  Dear patient,  As a result of recent federal legislation (The Federal Cures Act), you may   receive lab or pathology results from your procedure in your [facility]   account before your physician is able to contact you. Your physician or   their representative will relay the results to you with their   recommendations at their soonest availability.  Thank you,  PROVATION

## 2022-03-14 NOTE — OR NURSING
Procedure and recovery completed. Dr. Pedraza discussed findings with patient and discharge instructions given. Understanding verbalized. Patient ready for discharge.

## 2022-03-14 NOTE — ANESTHESIA PREPROCEDURE EVALUATION
03/14/2022  Jossie Collins is a 51 y.o., male.  Pre-operative evaluation for Procedure(s) (LRB):  COLONOSCOPY (N/A)    NPO >8 food; 2h prep    METS >4    There were no vitals filed for this visit.      There is no problem list on file for this patient.      Review of patient's allergies indicates:   Allergen Reactions    Penicillins Hives       No current facility-administered medications on file prior to encounter.     Current Outpatient Medications on File Prior to Encounter   Medication Sig Dispense Refill    fluticasone propionate (FLONASE) 50 mcg/actuation nasal spray 1 spray by Each Nostril route once daily.         Past Surgical History:   Procedure Laterality Date    JOINT REPLACEMENT  1988    right shoulder       Social History     Socioeconomic History    Marital status:    Tobacco Use    Smoking status: Never Smoker    Smokeless tobacco: Never Used   Substance and Sexual Activity    Alcohol use: Yes     Comment: social    Drug use: No         Lab Results   Component Value Date    WBC 4.36 12/06/2021    HGB 13.8 (L) 12/06/2021    HCT 40.7 12/06/2021    MCV 91 12/06/2021     12/06/2021       CMP  Sodium   Date Value Ref Range Status   12/06/2021 141 136 - 145 mmol/L Final     Potassium   Date Value Ref Range Status   12/06/2021 4.3 3.5 - 5.1 mmol/L Final     Chloride   Date Value Ref Range Status   12/06/2021 107 95 - 110 mmol/L Final     CO2   Date Value Ref Range Status   12/06/2021 26 23 - 29 mmol/L Final     Glucose   Date Value Ref Range Status   12/06/2021 114 (H) 70 - 110 mg/dL Final     BUN   Date Value Ref Range Status   12/06/2021 13 6 - 20 mg/dL Final     Creatinine   Date Value Ref Range Status   12/06/2021 1.0 0.5 - 1.4 mg/dL Final     Calcium   Date Value Ref Range Status   12/06/2021 9.6 8.7 - 10.5 mg/dL Final     Total Protein   Date Value Ref Range Status    2021 7.4 6.0 - 8.4 g/dL Final     Albumin   Date Value Ref Range Status   2021 4.0 3.5 - 5.2 g/dL Final     Total Bilirubin   Date Value Ref Range Status   2021 0.7 0.1 - 1.0 mg/dL Final     Comment:     For infants and newborns, interpretation of results should be based  on gestational age, weight and in agreement with clinical  observations.    Premature Infant recommended reference ranges:  Up to 24 hours.............<8.0 mg/dL  Up to 48 hours............<12.0 mg/dL  3-5 days..................<15.0 mg/dL  6-29 days.................<15.0 mg/dL       Alkaline Phosphatase   Date Value Ref Range Status   2021 63 55 - 135 U/L Final     AST   Date Value Ref Range Status   2021 27 10 - 40 U/L Final     ALT   Date Value Ref Range Status   2021 27 10 - 44 U/L Final     Anion Gap   Date Value Ref Range Status   2021 8 8 - 16 mmol/L Final     eGFR if    Date Value Ref Range Status   2021 >60.0 >60 mL/min/1.73 m^2 Final     eGFR if non    Date Value Ref Range Status   2021 >60.0 >60 mL/min/1.73 m^2 Final     Comment:     Calculation used to obtain the estimated glomerular filtration  rate (eGFR) is the CKD-EPI equation.            Diagnostic Studies:      EKD Echo:  No results found for this or any previous visit.        Pre-op Assessment    I have reviewed the Patient Summary Reports.     I have reviewed the Nursing Notes. I have reviewed the NPO Status.   I have reviewed the Medications.     Review of Systems  Anesthesia Hx:  No problems with previous Anesthesia  History of prior surgery of interest to airway management or planning:  Denies Personal Hx of Anesthesia complications.   Social:  Non-Smoker, Social Alcohol Use    Hematology/Oncology:  Hematology Normal   Oncology Normal     EENT/Dental:   chronic allergic rhinitis   Cardiovascular:  Cardiovascular Normal Exercise tolerance: good     Pulmonary:  Pulmonary Normal     Renal/:  Renal/ Normal     Hepatic/GI:   Bowel Prep.    Neurological:  Neurology Normal    Endocrine:  Endocrine Normal        Physical Exam  General: Well nourished    Airway:  Mallampati: II   Mouth Opening: Normal  TM Distance: > 6 cm  Tongue: Normal  Neck ROM: Normal ROM    Dental:  Intact        Anesthesia Plan  Type of Anesthesia, risks & benefits discussed:    Anesthesia Type: Gen Natural Airway, MAC  Intra-op Monitoring Plan: Standard ASA Monitors  Induction:  IV  Informed Consent: Informed consent signed with the Patient and all parties understand the risks and agree with anesthesia plan.  All questions answered.   ASA Score: 2  Day of Surgery Review of History & Physical: H&P Update referred to the surgeon/provider.    Ready For Surgery From Anesthesia Perspective.     .

## 2022-03-14 NOTE — TRANSFER OF CARE
Anesthesia Transfer of Care Note    Patient: Jossie Collins    Procedure(s) Performed: Procedure(s) (LRB):  COLONOSCOPY (N/A)    Patient location: GI    Anesthesia Type: general    Transport from OR: Transported from OR on room air with adequate spontaneous ventilation    Post pain: adequate analgesia    Post assessment: no apparent anesthetic complications and tolerated procedure well    Post vital signs: stable    Level of consciousness: awake and alert    Nausea/Vomiting: no nausea/vomiting    Complications: none    Transfer of care protocol was followed      Last vitals:   Visit Vitals  /62   Pulse 75   Temp 36.5 °C (97.7 °F) (Axillary)   Resp 18   SpO2 96%

## 2022-03-15 LAB
FINAL PATHOLOGIC DIAGNOSIS: NORMAL
GROSS: NORMAL
Lab: NORMAL

## 2022-07-26 ENCOUNTER — PATIENT MESSAGE (OUTPATIENT)
Dept: ADMINISTRATIVE | Facility: HOSPITAL | Age: 52
End: 2022-07-26
Payer: COMMERCIAL

## 2022-07-26 ENCOUNTER — PATIENT OUTREACH (OUTPATIENT)
Dept: ADMINISTRATIVE | Facility: HOSPITAL | Age: 52
End: 2022-07-26
Payer: COMMERCIAL

## 2023-01-11 ENCOUNTER — PATIENT OUTREACH (OUTPATIENT)
Dept: ADMINISTRATIVE | Facility: HOSPITAL | Age: 53
End: 2023-01-11
Payer: COMMERCIAL

## 2023-03-10 ENCOUNTER — OFFICE VISIT (OUTPATIENT)
Dept: FAMILY MEDICINE | Facility: CLINIC | Age: 53
End: 2023-03-10
Payer: COMMERCIAL

## 2023-03-10 VITALS
DIASTOLIC BLOOD PRESSURE: 80 MMHG | OXYGEN SATURATION: 95 % | RESPIRATION RATE: 18 BRPM | HEIGHT: 66 IN | HEART RATE: 73 BPM | SYSTOLIC BLOOD PRESSURE: 130 MMHG | TEMPERATURE: 98 F | WEIGHT: 190.25 LBS | BODY MASS INDEX: 30.58 KG/M2

## 2023-03-10 DIAGNOSIS — Z00.00 ANNUAL PHYSICAL EXAM: Primary | ICD-10-CM

## 2023-03-10 DIAGNOSIS — J30.2 SEASONAL ALLERGIC RHINITIS, UNSPECIFIED TRIGGER: ICD-10-CM

## 2023-03-10 DIAGNOSIS — Z23 NEED FOR SHINGLES VACCINE: ICD-10-CM

## 2023-03-10 DIAGNOSIS — Z12.5 SCREENING PSA (PROSTATE SPECIFIC ANTIGEN): ICD-10-CM

## 2023-03-10 DIAGNOSIS — Z80.42 FAMILY HISTORY OF PROSTATE CANCER: ICD-10-CM

## 2023-03-10 DIAGNOSIS — D64.9 ANEMIA, UNSPECIFIED TYPE: ICD-10-CM

## 2023-03-10 DIAGNOSIS — E55.9 VITAMIN D DEFICIENCY: ICD-10-CM

## 2023-03-10 PROCEDURE — 3075F SYST BP GE 130 - 139MM HG: CPT | Mod: CPTII,S$GLB,, | Performed by: FAMILY MEDICINE

## 2023-03-10 PROCEDURE — 99999 PR PBB SHADOW E&M-EST. PATIENT-LVL III: ICD-10-PCS | Mod: PBBFAC,,, | Performed by: FAMILY MEDICINE

## 2023-03-10 PROCEDURE — 3079F PR MOST RECENT DIASTOLIC BLOOD PRESSURE 80-89 MM HG: ICD-10-PCS | Mod: CPTII,S$GLB,, | Performed by: FAMILY MEDICINE

## 2023-03-10 PROCEDURE — 1159F PR MEDICATION LIST DOCUMENTED IN MEDICAL RECORD: ICD-10-PCS | Mod: CPTII,S$GLB,, | Performed by: FAMILY MEDICINE

## 2023-03-10 PROCEDURE — 90471 ZOSTER RECOMBINANT VACCINE: ICD-10-PCS | Mod: S$GLB,,, | Performed by: FAMILY MEDICINE

## 2023-03-10 PROCEDURE — 1159F MED LIST DOCD IN RCRD: CPT | Mod: CPTII,S$GLB,, | Performed by: FAMILY MEDICINE

## 2023-03-10 PROCEDURE — 90750 ZOSTER RECOMBINANT VACCINE: ICD-10-PCS | Mod: S$GLB,,, | Performed by: FAMILY MEDICINE

## 2023-03-10 PROCEDURE — 99999 PR PBB SHADOW E&M-EST. PATIENT-LVL III: CPT | Mod: PBBFAC,,, | Performed by: FAMILY MEDICINE

## 2023-03-10 PROCEDURE — 99396 PREV VISIT EST AGE 40-64: CPT | Mod: 25,S$GLB,, | Performed by: FAMILY MEDICINE

## 2023-03-10 PROCEDURE — 3008F BODY MASS INDEX DOCD: CPT | Mod: CPTII,S$GLB,, | Performed by: FAMILY MEDICINE

## 2023-03-10 PROCEDURE — 3079F DIAST BP 80-89 MM HG: CPT | Mod: CPTII,S$GLB,, | Performed by: FAMILY MEDICINE

## 2023-03-10 PROCEDURE — 99396 PR PREVENTIVE VISIT,EST,40-64: ICD-10-PCS | Mod: 25,S$GLB,, | Performed by: FAMILY MEDICINE

## 2023-03-10 PROCEDURE — 90750 HZV VACC RECOMBINANT IM: CPT | Mod: S$GLB,,, | Performed by: FAMILY MEDICINE

## 2023-03-10 PROCEDURE — 90471 IMMUNIZATION ADMIN: CPT | Mod: S$GLB,,, | Performed by: FAMILY MEDICINE

## 2023-03-10 PROCEDURE — 3075F PR MOST RECENT SYSTOLIC BLOOD PRESS GE 130-139MM HG: ICD-10-PCS | Mod: CPTII,S$GLB,, | Performed by: FAMILY MEDICINE

## 2023-03-10 PROCEDURE — 3008F PR BODY MASS INDEX (BMI) DOCUMENTED: ICD-10-PCS | Mod: CPTII,S$GLB,, | Performed by: FAMILY MEDICINE

## 2023-03-10 RX ORDER — FLUTICASONE PROPIONATE 50 MCG
1 SPRAY, SUSPENSION (ML) NASAL DAILY
Qty: 16 G | Refills: 5 | Status: SHIPPED | OUTPATIENT
Start: 2023-03-10

## 2023-03-10 NOTE — PROGRESS NOTES
Administered Shingrix vaccine dose #1 IM to left deltoid.  Patient tolerated injection well, no adverse reactions noted.

## 2023-03-10 NOTE — PROGRESS NOTES
Health Maintenance Due   Topic     COVID-19 Vaccine (4 - Booster for Moderna series) Not offered at this office    Shingles Vaccine (1 of 2) Pt decline    Influenza Vaccine (1) Pt decline

## 2023-03-10 NOTE — PROGRESS NOTES
"Subjective:       Patient ID: Jossie Collins is a 52 y.o. male.    Chief Complaint: Establish Care      HPI  52-year-old male presents for annual exam.  Doing well overall.  Denies any issues.        Review of Systems   Constitutional: Negative.    HENT: Negative.     Respiratory: Negative.     Cardiovascular: Negative.    Gastrointestinal: Negative.    Endocrine: Negative.    Genitourinary: Negative.    Musculoskeletal: Negative.    Neurological: Negative.    Psychiatric/Behavioral: Negative.          No past medical history on file.  Past Surgical History:   Procedure Laterality Date    COLONOSCOPY N/A 3/14/2022    Procedure: COLONOSCOPY;  Surgeon: Marylin Pedraza MD;  Location: [facility];  Service: Endoscopy;  Laterality: N/A;  COVID test at [facility] on 3/11-GT    JOINT REPLACEMENT  1988    right shoulder     No family history on file.  Social History     Socioeconomic History    Marital status:    Tobacco Use    Smoking status: Never    Smokeless tobacco: Never   Substance and Sexual Activity    Alcohol use: Yes     Comment: social    Drug use: No       Current Outpatient Medications:     fluticasone propionate (FLONASE) 50 mcg/actuation nasal spray, 1 spray (50 mcg total) by Each Nostril route once daily., Disp: 16 g, Rfl: 5   Objective:      Vitals:    03/10/23 0830   BP: 130/80   BP Location: Left arm   Patient Position: Sitting   BP Method: Small (Manual)   Pulse: 73   Resp: 18   Temp: 98.1 °F (36.7 °C)   TempSrc: Oral   SpO2: 95%   Weight: 86.3 kg (190 lb 4.1 oz)   Height: 5' 6" (1.676 m)       Physical Exam  Constitutional:       General: He is not in acute distress.  HENT:      Head: Normocephalic and atraumatic.      Nose: Congestion present.   Eyes:      Conjunctiva/sclera: Conjunctivae normal.   Cardiovascular:      Rate and Rhythm: Normal rate and regular rhythm.      Heart sounds: Normal heart sounds. No murmur heard.    No friction rub. No gallop.   Pulmonary:      Effort: Pulmonary " effort is normal.      Breath sounds: Normal breath sounds. No wheezing or rales.   Musculoskeletal:      Cervical back: Neck supple.   Skin:     General: Skin is warm and dry.   Neurological:      Mental Status: He is alert and oriented to person, place, and time.   Psychiatric:         Behavior: Behavior normal.         Thought Content: Thought content normal.         Judgment: Judgment normal.          Assessment:       1. Annual physical exam    2. Seasonal allergic rhinitis, unspecified trigger    3. Anemia, unspecified type    4. Screening PSA (prostate specific antigen)    5. Family history of prostate cancer    6. Vitamin D deficiency    7. Need for shingles vaccine          Plan:       Annual physical exam  -     CBC Auto Differential; Future; Expected date: 03/10/2023  -     Comprehensive Metabolic Panel; Future; Expected date: 03/10/2023  -     Hemoglobin A1C; Future; Expected date: 03/10/2023  -     TSH; Future; Expected date: 03/10/2023  -     Lipid Panel; Future; Expected date: 03/10/2023  -     PSA, SCREENING; Future; Expected date: 03/10/2023    Seasonal allergic rhinitis, unspecified trigger  -     fluticasone propionate (FLONASE) 50 mcg/actuation nasal spray; 1 spray (50 mcg total) by Each Nostril route once daily.  Dispense: 16 g; Refill: 5    Anemia, unspecified type  -     CBC Auto Differential; Future; Expected date: 03/10/2023    Screening PSA (prostate specific antigen)  -     PSA, SCREENING; Future; Expected date: 03/10/2023    Family history of prostate cancer  -     PSA, SCREENING; Future; Expected date: 03/10/2023    Vitamin D deficiency    Need for shingles vaccine  -     (In Office Administered) Zoster Recombinant Vaccine    Follow-up labs.  Given meds for allergies.          Future Appointments   Date Time Provider Department Center   5/12/2023  8:20 AM NURSE, [facility]       Patient note was created using [employer].  Any errors in syntax or even  information may not have been identified and edited on initial review prior to signing this note.

## 2023-05-12 ENCOUNTER — CLINICAL SUPPORT (OUTPATIENT)
Dept: FAMILY MEDICINE | Facility: CLINIC | Age: 53
End: 2023-05-12
Payer: COMMERCIAL

## 2023-05-12 DIAGNOSIS — Z23 NEED FOR SHINGLES VACCINE: Primary | ICD-10-CM

## 2023-05-12 PROCEDURE — 90471 ZOSTER RECOMBINANT VACCINE: ICD-10-PCS | Mod: S$GLB,,, | Performed by: FAMILY MEDICINE

## 2023-05-12 PROCEDURE — 90750 ZOSTER RECOMBINANT VACCINE: ICD-10-PCS | Mod: S$GLB,,, | Performed by: FAMILY MEDICINE

## 2023-05-12 PROCEDURE — 90750 HZV VACC RECOMBINANT IM: CPT | Mod: S$GLB,,, | Performed by: FAMILY MEDICINE

## 2023-05-12 PROCEDURE — 90471 IMMUNIZATION ADMIN: CPT | Mod: S$GLB,,, | Performed by: FAMILY MEDICINE

## 2023-05-12 PROCEDURE — 99999 PR PBB SHADOW E&M-EST. PATIENT-LVL II: ICD-10-PCS | Mod: PBBFAC,,,

## 2023-05-12 PROCEDURE — 99999 PR PBB SHADOW E&M-EST. PATIENT-LVL II: CPT | Mod: PBBFAC,,,

## 2023-09-19 ENCOUNTER — OFFICE VISIT (OUTPATIENT)
Dept: URGENT CARE | Facility: CLINIC | Age: 53
End: 2023-09-19
Payer: COMMERCIAL

## 2023-09-19 VITALS
HEIGHT: 66 IN | DIASTOLIC BLOOD PRESSURE: 68 MMHG | SYSTOLIC BLOOD PRESSURE: 153 MMHG | WEIGHT: 190 LBS | OXYGEN SATURATION: 98 % | HEART RATE: 76 BPM | RESPIRATION RATE: 19 BRPM | BODY MASS INDEX: 30.53 KG/M2 | TEMPERATURE: 99 F

## 2023-09-19 DIAGNOSIS — J30.9 ALLERGIC RHINITIS, UNSPECIFIED SEASONALITY, UNSPECIFIED TRIGGER: ICD-10-CM

## 2023-09-19 DIAGNOSIS — R05.9 COUGH, UNSPECIFIED TYPE: Primary | ICD-10-CM

## 2023-09-19 LAB
CTP QC/QA: YES
SARS-COV-2 AG RESP QL IA.RAPID: NEGATIVE

## 2023-09-19 PROCEDURE — 99213 PR OFFICE/OUTPT VISIT, EST, LEVL III, 20-29 MIN: ICD-10-PCS | Mod: S$GLB,,,

## 2023-09-19 PROCEDURE — 87811 SARS CORONAVIRUS 2 ANTIGEN POCT, MANUAL READ: ICD-10-PCS | Mod: QW,S$GLB,,

## 2023-09-19 PROCEDURE — 99213 OFFICE O/P EST LOW 20 MIN: CPT | Mod: S$GLB,,,

## 2023-09-19 PROCEDURE — 87811 SARS-COV-2 COVID19 W/OPTIC: CPT | Mod: QW,S$GLB,,

## 2023-09-19 RX ORDER — BENZONATATE 200 MG/1
200 CAPSULE ORAL 3 TIMES DAILY PRN
Qty: 30 CAPSULE | Refills: 0 | Status: SHIPPED | OUTPATIENT
Start: 2023-09-19

## 2023-09-19 RX ORDER — CETIRIZINE HYDROCHLORIDE 10 MG/1
10 TABLET ORAL DAILY
Qty: 30 TABLET | Refills: 0 | Status: SHIPPED | OUTPATIENT
Start: 2023-09-19 | End: 2023-10-19

## 2023-09-19 NOTE — PROGRESS NOTES
"Subjective:      Patient ID: Jossie Collins is a 52 y.o. male.    Vitals:  height is 5' 6" (1.676 m) and weight is 86.2 kg (190 lb). His oral temperature is 98.5 °F (36.9 °C). His blood pressure is 153/68 (abnormal) and his pulse is 76. His respiration is 19 and oxygen saturation is 98%.     Chief Complaint: Cough (Entered by patient)    Patient is a 52-year-old male presenting with a nonproductive cough that started 2 days ago.  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  Does state that he feels with environmental allergies.  Has been using Flonase.  He has a mild runny nose.  Denies fever, chills, chest pain, SOB, sore throat, ear pain.    Cough  This is a new problem. The current episode started in the past 7 days. The problem has been unchanged. The problem occurs constantly. The cough is Non-productive. Pertinent negatives include no chest pain, chills, fever, headaches, myalgias, rash, shortness of breath or wheezing. His past medical history is significant for environmental allergies.       Constitution: Negative for chills and fever.   HENT:  Negative for congestion.    Neck: Negative for neck pain.   Cardiovascular:  Negative for chest pain.   Respiratory:  Positive for cough. Negative for shortness of breath and wheezing.    Gastrointestinal:  Negative for abdominal pain, nausea, vomiting and diarrhea.   Musculoskeletal:  Negative for muscle ache.   Skin:  Negative for rash.   Allergic/Immunologic: Positive for environmental allergies. Negative for sneezing.   Neurological:  Negative for dizziness and headaches.      Objective:     Physical Exam   Constitutional: He is oriented to person, place, and time. He appears well-developed.   HENT:   Head: Normocephalic and atraumatic.   Ears:   Right Ear: External ear normal.   Left Ear: External ear normal.   Nose: Nose normal.   Mouth/Throat: Oropharynx is clear and moist. Mucous membranes are moist. Oropharynx is clear.   Eyes: " Conjunctivae, EOM and lids are normal. Pupils are equal, round, and reactive to light.   Neck: Trachea normal and phonation normal. Neck supple.   Cardiovascular: Normal rate, regular rhythm, normal heart sounds and normal pulses.   Pulmonary/Chest: Effort normal and breath sounds normal. No respiratory distress.   Musculoskeletal: Normal range of motion.         General: Normal range of motion.   Neurological: He is alert and oriented to person, place, and time.   Skin: Skin is warm, dry and intact.   Psychiatric: His speech is normal and behavior is normal. Judgment and thought content normal.   Nursing note and vitals reviewed.      Results for orders placed or performed in visit on 09/19/23   SARS Coronavirus 2 Antigen, POCT Manual Read   Result Value Ref Range    SARS Coronavirus 2 Antigen Negative Negative     Acceptable Yes      Assessment:     1. Cough, unspecified type    2. Allergic rhinitis, unspecified seasonality, unspecified trigger        Plan:       Cough, unspecified type  -     SARS Coronavirus 2 Antigen, POCT Manual Read  -     benzonatate (TESSALON) 200 MG capsule; Take 1 capsule (200 mg total) by mouth 3 (three) times daily as needed for Cough.  Dispense: 30 capsule; Refill: 0    Allergic rhinitis, unspecified seasonality, unspecified trigger  -     cetirizine (ZYRTEC) 10 MG tablet; Take 1 tablet (10 mg total) by mouth once daily.  Dispense: 30 tablet; Refill: 0              Patient Instructions   - Continue using flonase  - You can take cetirizine for allergy symptoms  - Benzonatate for coughing    - Follow up with your PCP or specialty clinic as directed in the next 1-2 weeks if not improved or as needed.  You can call [phone] to schedule an appointment with the appropriate provider.    - Go to the ER or seek medical attention immediately if you develop new or worsening symptoms.    - You must understand that you have received an Urgent Care treatment only and that you  may be released before all of your medical problems are known or treated.   - You, the patient, will arrange for follow up care as instructed.   - If your condition worsens or fails to improve we recommend that you receive another evaluation at the ER immediately or contact your PCP to discuss your concerns or return here.

## 2023-09-19 NOTE — PATIENT INSTRUCTIONS
- Continue using flonase  - You can take cetirizine for allergy symptoms  - Benzonatate for coughing    - Follow up with your PCP or specialty clinic as directed in the next 1-2 weeks if not improved or as needed.  You can call [phone] to schedule an appointment with the appropriate provider.    - Go to the ER or seek medical attention immediately if you develop new or worsening symptoms.    - You must understand that you have received an Urgent Care treatment only and that you may be released before all of your medical problems are known or treated.   - You, the patient, will arrange for follow up care as instructed.   - If your condition worsens or fails to improve we recommend that you receive another evaluation at the ER immediately or contact your PCP to discuss your concerns or return here.

## 2023-09-19 NOTE — LETTER
September 19, 2023      [facility] [place]  [address] [facility], [address]  [place] [address]  Phone: [phone]  Fax: [phone]       Patient: Jossie Collins   YOB: 1970  Date of Visit: 09/19/2023    To Whom It May Concern:    Sharan Collins  was at [facility] on 09/19/2023. The patient may return to work on 9/20/23. If you have any questions or concerns, or if I can be of further assistance, please do not hesitate to contact me.    Sincerely,    Андрей Paez PA-C

## 2024-10-03 ENCOUNTER — OFFICE VISIT (OUTPATIENT)
Dept: URGENT CARE | Facility: CLINIC | Age: 54
End: 2024-10-03
Payer: COMMERCIAL

## 2024-10-03 VITALS
SYSTOLIC BLOOD PRESSURE: 138 MMHG | TEMPERATURE: 99 F | HEART RATE: 80 BPM | RESPIRATION RATE: 16 BRPM | HEIGHT: 66 IN | WEIGHT: 192 LBS | OXYGEN SATURATION: 99 % | DIASTOLIC BLOOD PRESSURE: 83 MMHG | BODY MASS INDEX: 30.86 KG/M2

## 2024-10-03 DIAGNOSIS — J31.0 RHINITIS, UNSPECIFIED TYPE: ICD-10-CM

## 2024-10-03 DIAGNOSIS — J06.9 VIRAL URI WITH COUGH: Primary | ICD-10-CM

## 2024-10-03 DIAGNOSIS — R09.82 PND (POST-NASAL DRIP): ICD-10-CM

## 2024-10-03 PROCEDURE — 99213 OFFICE O/P EST LOW 20 MIN: CPT | Mod: S$GLB,,,

## 2024-10-03 RX ORDER — GUAIFENESIN AND DEXTROMETHORPHAN HYDROBROMIDE 1200; 60 MG/1; MG/1
1 TABLET, EXTENDED RELEASE ORAL 2 TIMES DAILY
Qty: 20 TABLET | Refills: 0 | Status: SHIPPED | OUTPATIENT
Start: 2024-10-03 | End: 2024-10-13

## 2024-10-03 RX ORDER — PROMETHAZINE HYDROCHLORIDE AND DEXTROMETHORPHAN HYDROBROMIDE 6.25; 15 MG/5ML; MG/5ML
5 SYRUP ORAL NIGHTLY PRN
Qty: 180 ML | Refills: 0 | Status: SHIPPED | OUTPATIENT
Start: 2024-10-03 | End: 2024-11-08

## 2024-10-03 RX ORDER — IPRATROPIUM BROMIDE 21 UG/1
2 SPRAY, METERED NASAL 2 TIMES DAILY
Qty: 15.8 ML | Refills: 0 | Status: SHIPPED | OUTPATIENT
Start: 2024-10-03 | End: 2024-11-02

## 2024-10-03 NOTE — PROGRESS NOTES
"Subjective:      Patient ID: Jossie Collins is a 53 y.o. male.    Vitals:  height is 5' 6" (1.676 m) and weight is 87.1 kg (192 lb). His oral temperature is 98.6 °F (37 °C). His blood pressure is 138/83 and his pulse is 80. His respiration is 16 and oxygen saturation is 99%.     Chief Complaint: Cough    Pt is a 52 yo male presenting with non-productive cough, congestion, PND. Onset of symptoms was 4d. Denies any CP, SOB, fever, chills, ABD pain, N/V/D. Pt reports using OTC Delsym. At home COVID test negative yesterday.    Cough  This is a new problem. The current episode started in the past 7 days. The problem has been unchanged. The problem occurs hourly. The cough is Non-productive. Associated symptoms include postnasal drip. Pertinent negatives include no chest pain, chills, ear pain, eye redness, fever, myalgias, rash, sore throat, shortness of breath or wheezing. Nothing aggravates the symptoms. He has tried OTC cough suppressant for the symptoms. The treatment provided no relief.       Constitution: Negative for activity change, appetite change, chills, fatigue and fever.   HENT:  Positive for congestion and postnasal drip. Negative for ear pain, sinus pain, sinus pressure and sore throat.    Neck: Negative for neck pain and neck swelling.   Cardiovascular:  Negative for chest pain and sob on exertion.   Eyes:  Negative for eye trauma, eye discharge and eye redness.   Respiratory:  Positive for cough and sputum production. Negative for shortness of breath and wheezing.    Gastrointestinal:  Negative for abdominal pain, nausea, vomiting, constipation and diarrhea.   Genitourinary:  Negative for dysuria, frequency, urgency and urine decreased.   Musculoskeletal:  Negative for pain, joint pain, joint swelling, abnormal ROM of joint and muscle ache.   Skin:  Negative for color change, rash, laceration and erythema.   Neurological:  Negative for dizziness, light-headedness, altered mental status and numbness. "   Psychiatric/Behavioral:  Negative for altered mental status and confusion.       Objective:     Physical Exam   Constitutional: He is oriented to person, place, and time. He appears well-developed. He is cooperative.  Non-toxic appearance. He does not appear ill. No distress.      Comments:Pt sitting erect on examination table. No acute respiratory distress, no use of accessory muscles, no notice of nasal flaring.        HENT:   Head: Normocephalic and atraumatic.   Ears:   Right Ear: Hearing, tympanic membrane, external ear and ear canal normal.   Left Ear: Hearing, tympanic membrane, external ear and ear canal normal.   Nose: Congestion present. No mucosal edema, rhinorrhea or nasal deformity. No epistaxis. Right sinus exhibits no maxillary sinus tenderness and no frontal sinus tenderness. Left sinus exhibits no maxillary sinus tenderness and no frontal sinus tenderness.   Mouth/Throat: Uvula is midline and mucous membranes are normal. No trismus in the jaw. Normal dentition. No uvula swelling. Posterior oropharyngeal erythema present. No oropharyngeal exudate or posterior oropharyngeal edema.   Eyes: Conjunctivae and lids are normal. No scleral icterus.   Neck: Trachea normal and phonation normal. Neck supple. No edema present. No erythema present. No neck rigidity present.   Cardiovascular: Normal rate, regular rhythm, normal heart sounds and normal pulses.   Pulmonary/Chest: Effort normal and breath sounds normal. No accessory muscle usage. No apnea, no tachypnea and no bradypnea. No respiratory distress. He has no decreased breath sounds. He has no wheezes. He has no rhonchi.   Lungs clear to auscultation B/L           Comments: Lungs clear to auscultation B/L      Abdominal: Normal appearance.   Musculoskeletal: Normal range of motion.         General: No deformity. Normal range of motion.   Neurological: He is alert and oriented to person, place, and time. He exhibits normal muscle tone. Coordination  normal.   Skin: Skin is warm, dry, intact, not diaphoretic and not pale. No erythema   Psychiatric: His speech is normal and behavior is normal. Judgment and thought content normal.   Nursing note and vitals reviewed.      Assessment:     1. Viral URI with cough    2. PND (post-nasal drip)    3. Rhinitis, unspecified type        Plan:   I have reviewed the patient chart and pertinent past imaging/labs.  No POCT as pt did testing at home. Will treat for viral etiology with symptomatic tx. Pt states understanding. Given strict ER precaution.     Viral URI with cough  -     promethazine-dextromethorphan (PROMETHAZINE-DM) 6.25-15 mg/5 mL Syrp; Take 5 mLs by mouth nightly as needed.  Dispense: 180 mL; Refill: 0    PND (post-nasal drip)  -     ipratropium (ATROVENT) 21 mcg (0.03 %) nasal spray; 2 sprays by Each Nostril route 2 (two) times daily.  Dispense: 15.8 mL; Refill: 0    Rhinitis, unspecified type  -     dextromethorphan-guaiFENesin (MUCINEX DM) 60-1,200 mg per 12 hr tablet; Take 1 tablet by mouth 2 (two) times daily. for 10 days  Dispense: 20 tablet; Refill: 0

## 2024-10-03 NOTE — PATIENT INSTRUCTIONS
Fever/Pain: Alternate Tylenol and Ibuprofen as needed every 4-6 hours  Cough: Cough syrup nightly as needed  Nasal congestion/Runny nose: Nasal spray twice daily as needed. Mucinex twice daily for 10 days.  Monitor for chest pain, shortness of breath, worsening of symptoms, or fever unresponsive to medication.   Please drink plenty of fluids.  Please get plenty of rest.  Please return here or go to the Emergency Department for any concerns or worsening of condition.  If you were prescribed antibiotics, please take them to completion.  If you were given wait & see antibiotics, please wait 5-7 days before taking them, and only take them if your symptoms have worsened or not improved.  If you do begin taking the antibiotics, please take them to completion.  If you were prescribed a narcotic medication, do not drive or operate heavy equipment or machinery while taking these medications.  If you were given a steroid shot in the clinic and have also been given a prescription for a steroid such as Prednisone or a Medrol Dose Pack, please begin taking them tomorrow.  If you do not have Hypertension or any history of palpitations, it is ok to take over the counter Sudafed or Mucinex D or Allegra-D or Claritin-D or Zyrtec-D.  If you do take one of the above, it is ok to combine that with plain over the counter Mucinex or Allegra or Claritin or Zyrtec.  If for example you are taking Zyrtec -D, you can combine that with Mucinex, but not Mucinex-D.  If you are taking Mucinex-D, you can combine that with plain Allegra or Claritin or Zyrtec.   If you do have Hypertension or palpitations, it is safe to take Coricidin HBP for relief of sinus symptoms.  If not allergic, please take over the counter Tylenol (Acetaminophen) and/or Motrin (Ibuprofen) as directed for control of pain and/or fever.  Please follow up with your primary care doctor or specialist as needed.    If you  smoke, please stop smoking.

## 2025-04-21 ENCOUNTER — RESULTS FOLLOW-UP (OUTPATIENT)
Dept: FAMILY MEDICINE | Facility: CLINIC | Age: 55
End: 2025-04-21

## 2025-04-21 ENCOUNTER — LAB VISIT (OUTPATIENT)
Dept: LAB | Facility: HOSPITAL | Age: 55
End: 2025-04-21
Payer: COMMERCIAL

## 2025-04-21 ENCOUNTER — OFFICE VISIT (OUTPATIENT)
Dept: FAMILY MEDICINE | Facility: CLINIC | Age: 55
End: 2025-04-21
Payer: COMMERCIAL

## 2025-04-21 VITALS
HEIGHT: 66 IN | WEIGHT: 194.88 LBS | SYSTOLIC BLOOD PRESSURE: 134 MMHG | HEART RATE: 68 BPM | RESPIRATION RATE: 18 BRPM | OXYGEN SATURATION: 96 % | DIASTOLIC BLOOD PRESSURE: 88 MMHG | BODY MASS INDEX: 31.32 KG/M2 | TEMPERATURE: 99 F

## 2025-04-21 DIAGNOSIS — Z00.00 ANNUAL PHYSICAL EXAM: Primary | ICD-10-CM

## 2025-04-21 DIAGNOSIS — Z00.00 ANNUAL PHYSICAL EXAM: ICD-10-CM

## 2025-04-21 LAB
ABSOLUTE EOSINOPHIL (OHS): 0.05 K/UL
ABSOLUTE MONOCYTE (OHS): 0.55 K/UL (ref 0.3–1)
ABSOLUTE NEUTROPHIL COUNT (OHS): 2.37 K/UL (ref 1.8–7.7)
ALBUMIN SERPL BCP-MCNC: 3.8 G/DL (ref 3.5–5.2)
ALBUMIN/CREAT UR: 82.7 UG/MG
ALP SERPL-CCNC: 65 UNIT/L (ref 40–150)
ALT SERPL W/O P-5'-P-CCNC: 21 UNIT/L (ref 10–44)
ANION GAP (OHS): 7 MMOL/L (ref 8–16)
AST SERPL-CCNC: 18 UNIT/L (ref 11–45)
BASOPHILS # BLD AUTO: 0.01 K/UL
BASOPHILS NFR BLD AUTO: 0.2 %
BILIRUB SERPL-MCNC: 0.7 MG/DL (ref 0.1–1)
BUN SERPL-MCNC: 12 MG/DL (ref 6–20)
CALCIUM SERPL-MCNC: 9.1 MG/DL (ref 8.7–10.5)
CHLORIDE SERPL-SCNC: 107 MMOL/L (ref 95–110)
CHOLEST SERPL-MCNC: 162 MG/DL (ref 120–199)
CHOLEST/HDLC SERPL: 5.1 {RATIO} (ref 2–5)
CO2 SERPL-SCNC: 27 MMOL/L (ref 23–29)
CREAT SERPL-MCNC: 1 MG/DL (ref 0.5–1.4)
CREAT UR-MCNC: 98 MG/DL (ref 23–375)
EAG (OHS): 100 MG/DL (ref 68–131)
ERYTHROCYTE [DISTWIDTH] IN BLOOD BY AUTOMATED COUNT: 11.8 % (ref 11.5–14.5)
GFR SERPLBLD CREATININE-BSD FMLA CKD-EPI: >60 ML/MIN/1.73/M2
GLUCOSE SERPL-MCNC: 105 MG/DL (ref 70–110)
HBA1C MFR BLD: 5.1 % (ref 4–5.6)
HCT VFR BLD AUTO: 41 % (ref 40–54)
HDLC SERPL-MCNC: 32 MG/DL (ref 40–75)
HDLC SERPL: 19.8 % (ref 20–50)
HGB BLD-MCNC: 13.5 GM/DL (ref 14–18)
IMM GRANULOCYTES # BLD AUTO: 0.02 K/UL (ref 0–0.04)
IMM GRANULOCYTES NFR BLD AUTO: 0.5 % (ref 0–0.5)
LDLC SERPL CALC-MCNC: 93.2 MG/DL (ref 63–159)
LYMPHOCYTES # BLD AUTO: 1.09 K/UL (ref 1–4.8)
MCH RBC QN AUTO: 30.2 PG (ref 27–31)
MCHC RBC AUTO-ENTMCNC: 32.9 G/DL (ref 32–36)
MCV RBC AUTO: 92 FL (ref 82–98)
MICROALBUMIN UR-MCNC: 81 UG/ML (ref ?–5000)
NONHDLC SERPL-MCNC: 130 MG/DL
NUCLEATED RBC (/100WBC) (OHS): 0 /100 WBC
PLATELET # BLD AUTO: 223 K/UL (ref 150–450)
PMV BLD AUTO: 10.2 FL (ref 9.2–12.9)
POTASSIUM SERPL-SCNC: 3.9 MMOL/L (ref 3.5–5.1)
PROT SERPL-MCNC: 7.1 GM/DL (ref 6–8.4)
PSA SERPL-MCNC: 0.37 NG/ML
RBC # BLD AUTO: 4.47 M/UL (ref 4.6–6.2)
RELATIVE EOSINOPHIL (OHS): 1.2 %
RELATIVE LYMPHOCYTE (OHS): 26.7 % (ref 18–48)
RELATIVE MONOCYTE (OHS): 13.4 % (ref 4–15)
RELATIVE NEUTROPHIL (OHS): 58 % (ref 38–73)
SODIUM SERPL-SCNC: 141 MMOL/L (ref 136–145)
TRIGL SERPL-MCNC: 184 MG/DL (ref 30–150)
TSH SERPL-ACNC: 1 UIU/ML (ref 0.4–4)
WBC # BLD AUTO: 4.09 K/UL (ref 3.9–12.7)

## 2025-04-21 PROCEDURE — 80053 COMPREHEN METABOLIC PANEL: CPT

## 2025-04-21 PROCEDURE — 99999 PR PBB SHADOW E&M-EST. PATIENT-LVL III: CPT | Mod: PBBFAC,,,

## 2025-04-21 PROCEDURE — 1159F MED LIST DOCD IN RCRD: CPT | Mod: CPTII,S$GLB,,

## 2025-04-21 PROCEDURE — 3075F SYST BP GE 130 - 139MM HG: CPT | Mod: CPTII,S$GLB,,

## 2025-04-21 PROCEDURE — 82570 ASSAY OF URINE CREATININE: CPT

## 2025-04-21 PROCEDURE — 85025 COMPLETE CBC W/AUTO DIFF WBC: CPT

## 2025-04-21 PROCEDURE — 36415 COLL VENOUS BLD VENIPUNCTURE: CPT | Mod: PO

## 2025-04-21 PROCEDURE — 83036 HEMOGLOBIN GLYCOSYLATED A1C: CPT

## 2025-04-21 PROCEDURE — 80061 LIPID PANEL: CPT

## 2025-04-21 PROCEDURE — 3079F DIAST BP 80-89 MM HG: CPT | Mod: CPTII,S$GLB,,

## 2025-04-21 PROCEDURE — 3008F BODY MASS INDEX DOCD: CPT | Mod: CPTII,S$GLB,,

## 2025-04-21 PROCEDURE — 1160F RVW MEDS BY RX/DR IN RCRD: CPT | Mod: CPTII,S$GLB,,

## 2025-04-21 PROCEDURE — 99396 PREV VISIT EST AGE 40-64: CPT | Mod: S$GLB,,,

## 2025-04-21 PROCEDURE — 84443 ASSAY THYROID STIM HORMONE: CPT

## 2025-04-21 PROCEDURE — 84153 ASSAY OF PSA TOTAL: CPT

## 2025-04-21 NOTE — PROGRESS NOTES
Health Maintenance Due   Topic Date Due    Pneumococcal Vaccines (Age 50+) (1 of 1 - PCV) Consult PCP     Influenza Vaccine (1) Consult PCP     COVID-19 Vaccine (4 - 2024-25 season) Consult PCP     Hemoglobin A1c (Diabetic Prevention Screening)  Consult PCP

## 2025-04-21 NOTE — PROGRESS NOTES
"            Family Medicine     Patient name: Jossie Collins  MRN: [id number]  : 1970  PCP NAME: Frank Madden MD    Subjective     History of Present Illness:  Patient ID: Jossie Collins is a 54 y.o. Black or  male presents to the clinic today. Chronic medical issues, if present, have been documented.   There are no active problems to display for this patient.      Chief Complaint  Chief Complaint   Patient presents with    Annual Exam     Presents for annual physical exam.  No new concerns.  No hospital admissions in the last year.  Works at [employer].  Feels at baseline today.  Not currently on any medication.  Gets 8 - 9 hours of sleep    Medications   Medication List with Changes/Refills   Current Medications    FLUTICASONE PROPIONATE (FLONASE) 50 MCG/ACTUATION NASAL SPRAY    1 spray (50 mcg total) by Each Nostril route once daily.   Discontinued Medications    BENZONATATE (TESSALON) 200 MG CAPSULE    Take 1 capsule (200 mg total) by mouth 3 (three) times daily as needed for Cough.    CETIRIZINE (ZYRTEC) 10 MG TABLET    Take 1 tablet (10 mg total) by mouth once daily.       Allergies  Review of patient's allergies indicates:   Allergen Reactions    Penicillins Hives     Past Medical history  History reviewed. No pertinent past medical history.       Surgical History  Past Surgical History:   Procedure Laterality Date    COLONOSCOPY N/A 3/14/2022    Procedure: COLONOSCOPY;  Surgeon: Marylin Pedraza MD;  Location: [facility];  Service: Endoscopy;  Laterality: N/A;  COVID test at [place] on 3/11-GT    JOINT REPLACEMENT      right shoulder     Family History  No family history on file.  Social History  Social History[1]       Review of system     ROS  Negative except as mentioned above  Physical exam   Vital Signs  Vitals:    25 0848   BP: 134/88   Pulse: 68   Resp: 18   Temp: 98.9 °F (37.2 °C)   TempSrc: Oral   SpO2: 96%   Weight: 88.4 kg (194 lb 14.2 oz)   Height: 5' 6" " (1.676 m)       Physical Exam  Constitutional:       General: He is not in acute distress.     Appearance: He is not ill-appearing.   HENT:      Head: Normocephalic.   Cardiovascular:      Rate and Rhythm: Normal rate and regular rhythm.      Pulses: Normal pulses.      Heart sounds: Normal heart sounds. No murmur heard.     No gallop.   Pulmonary:      Effort: Pulmonary effort is normal. No respiratory distress.      Breath sounds: Normal breath sounds. No wheezing.   Abdominal:      General: There is no distension.      Palpations: Abdomen is soft.      Tenderness: There is no abdominal tenderness.   Musculoskeletal:      Right lower leg: No edema.      Left lower leg: No edema.   Neurological:      Mental Status: He is alert and oriented to person, place, and time.   Psychiatric:         Mood and Affect: Mood normal.         Wt Readings from Last 3 Encounters:   04/21/25 88.4 kg (194 lb 14.2 oz)   10/03/24 87.1 kg (192 lb)   09/19/23 86.2 kg (190 lb)          Body mass index is 31.46 kg/m².      Laboratory data and other diagnostic findings     Lab Results   Component Value Date    WBC 4.36 12/06/2021    HGB 13.8 (L) 12/06/2021    HCT 40.7 12/06/2021    MCV 91 12/06/2021     12/06/2021         Lab Results   Component Value Date    CREATININE 1.0 12/06/2021    BUN 13 12/06/2021     12/06/2021    K 4.3 12/06/2021     12/06/2021    CO2 26 12/06/2021         Assessment and plan       Annual physical exam  -     Lipid Panel; Future; Expected date: 04/21/2025  -     TSH; Future; Expected date: 04/21/2025  -     Hemoglobin A1C; Future; Expected date: 04/21/2025  -     Comprehensive Metabolic Panel; Future; Expected date: 04/21/2025  -     CBC Auto Differential; Future; Expected date: 04/21/2025  -     PSA, Screening; Future; Expected date: 04/21/2025  -     Microalbumin/Creatinine Ratio, Urine; Future; Expected date: 04/21/2025           Problem List Items Addressed This Visit    None  Visit Diagnoses          Annual physical exam    -  Primary    Relevant Orders    Lipid Panel    TSH    Hemoglobin A1C    Comprehensive Metabolic Panel    CBC Auto Differential    PSA, Screening    Microalbumin/Creatinine Ratio, Urine                   Future Appointments  Future Appointments   Date Time Provider Department Center   4/21/2025  9:45 AM LAB, [facility] [place]         Follow up in about 1 year (around 4/21/2026).      Frank Madden MD    This office note was created by combination of typed  and MModal dictation.  Transcription errors may be present.  If there are any questions, please contact me.               [1]   Social History  Tobacco Use    Smoking status: Never    Smokeless tobacco: Never   Substance Use Topics    Alcohol use: Yes     Comment: social    Drug use: No